# Patient Record
Sex: FEMALE | Race: WHITE | Employment: UNEMPLOYED | URBAN - METROPOLITAN AREA
[De-identification: names, ages, dates, MRNs, and addresses within clinical notes are randomized per-mention and may not be internally consistent; named-entity substitution may affect disease eponyms.]

---

## 2017-10-31 ENCOUNTER — OFFICE VISIT (OUTPATIENT)
Dept: CARDIOLOGY CLINIC | Age: 37
End: 2017-10-31

## 2017-10-31 VITALS
OXYGEN SATURATION: 98 % | HEART RATE: 84 BPM | WEIGHT: 129 LBS | DIASTOLIC BLOOD PRESSURE: 70 MMHG | BODY MASS INDEX: 20.73 KG/M2 | SYSTOLIC BLOOD PRESSURE: 100 MMHG | HEIGHT: 66 IN

## 2017-10-31 DIAGNOSIS — I49.1 PAC (PREMATURE ATRIAL CONTRACTION): Primary | ICD-10-CM

## 2017-10-31 DIAGNOSIS — I47.1 SVT (SUPRAVENTRICULAR TACHYCARDIA) (HCC): ICD-10-CM

## 2017-10-31 DIAGNOSIS — I47.1 ATRIAL TACHYCARDIA (HCC): ICD-10-CM

## 2017-10-31 DIAGNOSIS — R00.2 PALPITATIONS: ICD-10-CM

## 2017-10-31 PROCEDURE — 93000 ELECTROCARDIOGRAM COMPLETE: CPT | Performed by: INTERNAL MEDICINE

## 2017-10-31 PROCEDURE — 99205 OFFICE O/P NEW HI 60 MIN: CPT | Performed by: INTERNAL MEDICINE

## 2017-10-31 RX ORDER — ACETAMINOPHEN 160 MG
1 TABLET,DISINTEGRATING ORAL DAILY
COMMUNITY

## 2017-10-31 RX ORDER — DIPHENHYDRAMINE HCL 25 MG
25 CAPSULE ORAL EVERY 6 HOURS PRN
COMMUNITY
End: 2019-07-10 | Stop reason: ALTCHOICE

## 2017-10-31 NOTE — PROGRESS NOTES
[address]   Electrophysiology Consultation   Date: 10/31/2017  Reason for Consultation: PAC's/ atrial tachycardia  Consult Requesting Physician: Radha Regalado DO    Chief Complaint: Racing heart    HPI: Monika Kapoor is a 44 y. o.with a PMH significant for severe IBS, spastic colon and small bowel bacterial overgrowth disease managed with chronic pain medication and accupuncture (managed through [facility]). She presents today for further evaluation of PAC's and atrial tachycardia. She recently established care with [facility] and an EPS/ RFA was recommended but due to insurance coverage she needed to switch care. She states that she has multiple episodes almost daily that include a sensation of a racing heart, SOB and chest pain that leave her exhausted. She feels like the palpitations are \"going into her throat\" and her heart at times is \"flipping over. \"  [place] and is having difficulty completing her ADL's due to the associated SOB. She used to [employer] which helped but that is no longer providing enough control. She denies lightheadedness, dizziness, orthopnea, edema, presyncope or syncope. Past Medical History:   Diagnosis Date    Bacterial overgrowth syndrome     Chest pain on breathing     Heart murmur     IBS (irritable bowel syndrome)     Kidney stones     Lightheaded 1/12/2016    MVP (mitral valve prolapse)     Near syncope 1/12/2016    Palpitations 1/12/2016    [facility] tachy[facility]     Right ear pain 1/12/2016    Small bowel problem     states that she has small bowel overgrowth of bacteria    Spastic colon     Tachycardia         Past Surgical History:   Procedure Laterality Date    COLONOSCOPY  2009    DENTAL SURGERY      HYSTERECTOMY      LAPAROSCOPY         Allergies:   Allergies   Allergen Reactions    Multivitamins Other (See Comments)     IBS    Penicillins Hives    Percocet [Oxycodone-Acetaminophen]

## 2017-10-31 NOTE — PATIENT INSTRUCTIONS
Patient Education        Learning About Catheter Ablation for Heart Rhythm Problems  What is catheter ablation? Catheter ablation is a procedure that treats heart rhythm problems. These problems include atrial fibrillation, supraventricular tachycardia (SVT), atrial flutter, and ventricular tachycardia. Your heart should have a strong, steady beat. That beat is controlled by the heart's electrical system. Sometimes that system misfires. This causes a heartbeat that is too fast and isn't steady. Catheter ablation is a way to get into your heart and fix the problem. Ablation is not surgery. How is catheter ablation done? Your doctor inserts thin tubes called catheters into a blood vessel in your groin, arm, or neck. Then your doctor feeds them into the heart. Wires in the catheters help the doctor find the problem areas. Then he or she uses the wires to send energy to destroy the tiny areas of heart tissue that are causing the problems. It may seem like a bad idea to destroy parts of your heart on purpose. But the areas that are destroyed are very tiny. They should not affect your heart's ability to do its job. You may be awake during the procedure. Or you may be asleep. The doctor will give you medicines to help you feel relaxed and to numb the areas where the catheters go in. You may feel a little uncomfortable, but you should not feel pain. This procedure usually takes 2 to 6 hours. In rare cases, it can take longer. You may stay overnight in the hospital. How long you stay in the hospital depends on the type of ablation you have. What can you expect after catheter ablation? Do not exercise hard or lift anything heavy for a week. You will probably be able to go back to work and to your normal routine in 1 or 2 days. You may have swelling, bruising, or a small lump around the site where the catheters went into your body. These should go away in 3 to 4 weeks.   You may have to take some medicines for a Sometimes a procedure called catheter ablation is done during an EPS. This procedure destroys (ablates) small areas of your heart that are causing your heart rhythm problem. The doctor puts plastic tubes called catheters into blood vessels in your groin, arm, or neck. He or she then uses an X-ray machine to guide long wires through the tubes to your heart. The doctor can use these wires to record your heart's electrical signals. If the doctor thinks your problem can be fixed with ablation, he or she can use the wires to destroy a small part of your heart tissue. This is most often done with radio waves. You will probably be awake during the procedure. But you could be asleep. The doctor will give you medicines to help you feel relaxed and to numb the areas where the catheters go in. An EPS and ablation can take 2 to 6 hours. In rare cases, it can take longer. If you have EPS only and you do not need more treatment, you may go home the same day. But if you also have ablation, you may stay overnight in the hospital. How long you stay in the hospital depends on the type of ablation you have. Do not exercise hard or lift anything heavy for a week. You may be able to go back to work and to your normal routine in 1 or 2 days. Follow-up care is a key part of your treatment and safety. Be sure to make and go to all appointments, and call your doctor if you are having problems. It's also a good idea to know your test results and keep a list of the medicines you take. What happens before the procedure? Procedures can be stressful. This information will help you understand what you can expect. And it will help you safely prepare for your procedure. Preparing for the procedure  · Understand exactly what procedure is planned, along with the risks, benefits, and other options. · Tell your doctors ALL the medicines, vitamins, supplements, and herbal remedies you take.  Some of these can increase the risk of bleeding or interact with anesthesia. · If you take blood thinners, such as warfarin (Coumadin), clopidogrel (Plavix), or aspirin, be sure to talk to your doctor. He or she will tell you if you should stop taking these medicines before your procedure. Make sure that you understand exactly what your doctor wants you to do. · Your doctor will tell you which medicines to take or stop before your procedure. You may need to stop taking certain medicines a week or more before the procedure. So talk to your doctor as soon as you can. · If you have an advance directive, let your doctor know. It may include a living will and a durable power of  for health care. Bring a copy to the hospital. If you don't have one, you may want to prepare one. It lets your doctor and loved ones know your health care wishes. Doctors advise that everyone prepare these papers before any type of surgery or procedure. What happens on the day of the procedure? · Follow the instructions exactly about when to stop eating and drinking. If you don't, your procedure may be canceled. If your doctor told you to take your medicines on the day of the procedure, take them with only a sip of water. · Take a bath or shower before you come in for your procedure. Do not apply lotions, perfumes, deodorants, or nail polish. · Take off all jewelry and piercings. And take out contact lenses, if you wear them. At the hospital or surgery center  · Bring a picture ID. · You will be kept comfortable and safe by your anesthesia provider. The anesthesia may make you sleep. Or it may just numb the area being worked on. · EPS by itself may take 1 to 3 hours. But if you also have ablation, the whole procedure may take 2 to 6 hours. · After the procedure, pressure will be applied to the area where the catheter was put in your blood vessel. Then the area may be covered with a bandage or a compression device. This will prevent bleeding.   · Nurses will check your heart rate and blood pressure. The nurse will also check the catheter site for bleeding. · If the catheter was put in your groin, you will need to lie still and keep your leg straight for several hours. The nurse may put a weighted bag on your leg to keep it still. · If the catheter was put in your arm, you may be able to sit up and get out of bed right away. But you will need to keep your arm still for at least 1 hour. · You may have a bruise or a small lump where the catheter was put in your blood vessel. This is normal and will go away. Going home  · Be sure you have someone to drive you home. Anesthesia and pain medicine make it unsafe for you to drive. · You will be given more specific instructions about recovering from your procedure. They will cover things like diet, wound care, follow-up care, driving, and getting back to your normal routine. When should you call your doctor? · You have questions or concerns. · You don't understand how to prepare for your procedure. · You become ill before the procedure (such as fever, flu, or a cold). · You need to reschedule or have changed your mind about having the procedure. Where can you learn more? Go to [email or web address].Revver. org and sign in to your [facility] account. Enter F765 in the [facility] box to learn more about \"Electrophysiology Study and Catheter Ablation: Before Your Procedure. \"     If you do not have an account, please click on the \"Sign Up Now\" link. Current as of: April 3, 2017  Content Version: 11.3  © 5671-6274 [employer]. Care instructions adapted under license by [facility] ([facility]). If you have questions about a medical condition or this instruction, always ask your healthcare professional. [place] [address] any warranty or liability for your use of this information.

## 2017-11-02 ENCOUNTER — TELEPHONE (OUTPATIENT)
Dept: CARDIOLOGY CLINIC | Age: 37
End: 2017-11-02

## 2017-11-25 ENCOUNTER — HOSPITAL ENCOUNTER (OUTPATIENT)
Dept: OTHER | Age: 37
Discharge: HOME | End: 2017-11-25
Attending: INTERNAL MEDICINE | Admitting: INTERNAL MEDICINE

## 2017-11-25 LAB
ANION GAP SERPL CALCULATED.3IONS-SCNC: 10 MMOL/L (ref 3–16)
BUN BLDV-MCNC: 11 MG/DL (ref 7–20)
CALCIUM SERPL-MCNC: 9.8 MG/DL (ref 8.3–10.6)
CHLORIDE BLD-SCNC: 100 MMOL/L (ref 99–110)
CO2: 30 MMOL/L (ref 21–32)
CREAT SERPL-MCNC: 0.6 MG/DL (ref 0.6–1.1)
GFR AFRICAN AMERICAN: >60
GFR NON-AFRICAN AMERICAN: >60
GLUCOSE BLD-MCNC: 77 MG/DL (ref 70–99)
HCT VFR BLD CALC: 40.8 % (ref 36–48)
HEMOGLOBIN: 14.1 G/DL (ref 12–16)
INR BLD: 1.13 (ref 0.85–1.15)
MCH RBC QN AUTO: 28.9 PG (ref 26–34)
MCHC RBC AUTO-ENTMCNC: 34.5 G/DL (ref 31–36)
MCV RBC AUTO: 83.8 FL (ref 80–100)
PDW BLD-RTO: 13.4 % (ref 12.4–15.4)
PLATELET # BLD: 228 K/UL (ref 135–450)
PMV BLD AUTO: 8 FL (ref 5–10.5)
POTASSIUM SERPL-SCNC: 4.3 MMOL/L (ref 3.5–5.1)
PROTHROMBIN TIME: 12.8 SEC (ref 9.6–13)
RBC # BLD: 4.87 M/UL (ref 4–5.2)
SODIUM BLD-SCNC: 140 MMOL/L (ref 136–145)
WBC # BLD: 6.1 K/UL (ref 4–11)

## 2017-12-08 ENCOUNTER — TELEPHONE (OUTPATIENT)
Dept: CARDIOLOGY CLINIC | Age: 37
End: 2017-12-08

## 2017-12-11 DIAGNOSIS — M79.89 LEFT LEG SWELLING: ICD-10-CM

## 2017-12-11 DIAGNOSIS — I83.812 VARICOSE VEINS OF LEFT LOWER EXTREMITY WITH PAIN: ICD-10-CM

## 2017-12-11 DIAGNOSIS — M79.605 LEFT LEG PAIN: Primary | ICD-10-CM

## 2017-12-12 ENCOUNTER — HOSPITAL ENCOUNTER (OUTPATIENT)
Dept: VASCULAR LAB | Age: 37
Discharge: HOME | End: 2017-12-12
Attending: INTERNAL MEDICINE | Admitting: INTERNAL MEDICINE

## 2017-12-12 DIAGNOSIS — M79.605 LEFT LEG PAIN: ICD-10-CM

## 2017-12-12 DIAGNOSIS — M79.605 PAIN OF LEFT LEG: ICD-10-CM

## 2017-12-12 DIAGNOSIS — M79.89 LEFT LEG SWELLING: ICD-10-CM

## 2017-12-12 DIAGNOSIS — I83.812 VARICOSE VEINS OF LEFT LOWER EXTREMITY WITH PAIN: ICD-10-CM

## 2017-12-12 NOTE — TELEPHONE ENCOUNTER
Imaging has not resulted yet. I called pt to let her know that we will call her once the physician has sent through results. She verbalized understanding.

## 2018-01-10 ENCOUNTER — OFFICE VISIT (OUTPATIENT)
Dept: CARDIOLOGY CLINIC | Age: 38
End: 2018-01-10

## 2018-01-10 VITALS
WEIGHT: 128 LBS | BODY MASS INDEX: 20.57 KG/M2 | SYSTOLIC BLOOD PRESSURE: 98 MMHG | OXYGEN SATURATION: 99 % | HEART RATE: 62 BPM | DIASTOLIC BLOOD PRESSURE: 62 MMHG | HEIGHT: 66 IN

## 2018-01-10 DIAGNOSIS — Z86.79 S/P ABLATION OF VENTRICULAR ARRHYTHMIA: ICD-10-CM

## 2018-01-10 DIAGNOSIS — I47.1 SVT (SUPRAVENTRICULAR TACHYCARDIA) (HCC): Primary | ICD-10-CM

## 2018-01-10 DIAGNOSIS — Z98.890 S/P ABLATION OF VENTRICULAR ARRHYTHMIA: ICD-10-CM

## 2018-01-10 DIAGNOSIS — I47.1 ATRIAL TACHYCARDIA (HCC): ICD-10-CM

## 2018-01-10 PROCEDURE — 99213 OFFICE O/P EST LOW 20 MIN: CPT | Performed by: NURSE PRACTITIONER

## 2018-01-10 PROCEDURE — 93000 ELECTROCARDIOGRAM COMPLETE: CPT | Performed by: NURSE PRACTITIONER

## 2018-01-10 NOTE — PROGRESS NOTES
 Palpitations 01/12/2016    Near syncope 01/12/2016        Plan:  1. Atrial tach   S/p EPS and ablation    Remains in SR   2. Palpitations   Improved    Continue to monitor     Thank you for allowing me to participate in the care of Hernán Puente. Further evaluation will be based upon the patient's clinical course and testing results. All questions and concerns were addressed to the patient/family. Alternatives to my treatment were discussed. Yung Chawla, [address]  Electrophysiology   1/10/2018  11:57 AM          Patient instructed on life style modifications   Tobacco use was discussed with the patient and patient educated on the negative effects. I have asked the patient to not utilize these agents.       FASTING LIPID PANEL:No results found for: HDL, LDLDIRECT, LDLCALC, TRIG

## 2019-05-21 ENCOUNTER — TELEPHONE (OUTPATIENT)
Dept: CARDIOLOGY CLINIC | Age: 39
End: 2019-05-21

## 2019-05-21 DIAGNOSIS — I47.1 SVT (SUPRAVENTRICULAR TACHYCARDIA) (HCC): Primary | ICD-10-CM

## 2019-05-21 NOTE — TELEPHONE ENCOUNTER
appt made with GWEN for 7/10/19 at 1245, kimmy will make pt a event monitor appt. Pt was instructed that if her sx get worse at any time the she is to go to the ER. I advised her also that if there is something that shows up on the monitor that Christiano will possibly call her then us to advise of the sx she was having. She verbalized understanding.

## 2019-05-21 NOTE — TELEPHONE ENCOUNTER
PT calling was a pt of Dr Erinn Barrera at [place] until he left. Pt would now like to come see MXEZIO here at [facility]. She has been having Tachycardia again really bad, has tingling in left arm, would like to get in next week if possible.  Can we do either 05/29/19 12:15 or 05/30/19 12:45pm? Pls advise Thank you

## 2019-05-23 ENCOUNTER — NURSE ONLY (OUTPATIENT)
Dept: CARDIOLOGY CLINIC | Age: 39
End: 2019-05-23
Payer: COMMERCIAL

## 2019-05-23 DIAGNOSIS — I47.1 SVT (SUPRAVENTRICULAR TACHYCARDIA) (HCC): Primary | ICD-10-CM

## 2019-06-25 PROCEDURE — 93228 REMOTE 30 DAY ECG REV/REPORT: CPT | Performed by: INTERNAL MEDICINE

## 2019-07-09 NOTE — PROGRESS NOTES
paternal grandfather, and paternal grandmother; High Blood Pressure in her mother; High Cholesterol in her mother; Other in her maternal grandfather, maternal grandmother, paternal grandfather, paternal grandmother, and sister. Reviewed. Denies family history of sudden cardiac death, arrhythmia, premature CAD    Review of System:  All other systems reviewed and are negative except for that noted above. Pertinent negatives are:   General: negative for fever, chills   Ophthalmic ROS: negative for - eye pain or loss of vision  ENT ROS: negative for - headaches, sore throat   Respiratory: negative for - cough, sputum  Cardiovascular: Reviewed in HPI  Gastrointestinal: negative for - abdominal pain, diarrhea, N/V  Hematology: negative for - bleeding, blood clots, bruising or jaundice  Genito-Urinary:  negative for - Dysuria or incontinence  Musculoskeletal: negative for - Joint swelling, muscle pain  Neurological: negative for - confusion, dizziness, headaches   Psychiatric: No anxiety, no depression. Dermatological: negative for - rash    Physical Examination:  Vitals:    07/10/19 1237   BP: 120/78   Pulse: 62      Wt Readings from Last 3 Encounters:   07/10/19 137 lb (62.1 kg)   01/10/18 128 lb (58.1 kg)   12/01/17 124 lb 5.4 oz (56.4 kg)       · Constitutional: Oriented. No distress. · Head: Normocephalic and atraumatic. · Mouth/Throat: Oropharynx is clear and moist.   · Eyes: Conjunctivae normal. EOM are normal.   · Neck: Neck supple. No rigidity. No JVD present. · Cardiovascular: Normal rate, regular rhythm, S1&S2. · Pulmonary/Chest: Bilateral respiratory sounds. No wheezes, No rhonchi. · Abdominal: Soft. Bowel sounds present. No distension, No tenderness. · Musculoskeletal: No tenderness. No edema    · Lymphadenopathy: Has no cervical adenopathy. · Neurological: Alert and oriented. Cranial nerve appears intact, No Gross deficit   · Skin: Skin is warm and dry. No rash noted.    · Psychiatric: Has PVC burden    Sinus tachycardia    F/u if worsening and will do ablation      MVP   stable  I independently reviewed  MCOT *    Thank you for allowing me to participate in the care of Jennifer West. Further evaluation will be based upon the patient's clinical course and testing results. All questions and concerns were addressed to the patient/family. Alternatives to my treatment were discussed. I have discussed the above stated plan and the patient verbalized understanding and agreed with the plan. NOTE: This report was transcribed using voice recognition software. Every effort was made to ensure accuracy, however, inadvertent computerized transcription errors may be present. Og Boles MD, U[facility] Cazares [address] [facility]   Office: [phone]    Scribe attestation:  This note was scribed in the presence of Og Boles MD by Kesha Lyn RN    I, Dr. Og Boles personally performed the services described in this documentation as scribed by RN in my presence, and it is both accurate and complete.

## 2019-07-10 ENCOUNTER — OFFICE VISIT (OUTPATIENT)
Dept: CARDIOLOGY CLINIC | Age: 39
End: 2019-07-10
Payer: COMMERCIAL

## 2019-07-10 VITALS
WEIGHT: 137 LBS | DIASTOLIC BLOOD PRESSURE: 78 MMHG | BODY MASS INDEX: 22.02 KG/M2 | HEIGHT: 66 IN | HEART RATE: 62 BPM | SYSTOLIC BLOOD PRESSURE: 120 MMHG

## 2019-07-10 DIAGNOSIS — I47.1 ATRIAL TACHYCARDIA (HCC): Primary | ICD-10-CM

## 2019-07-10 DIAGNOSIS — I47.1 SVT (SUPRAVENTRICULAR TACHYCARDIA) (HCC): ICD-10-CM

## 2019-07-10 DIAGNOSIS — R00.2 PALPITATIONS: ICD-10-CM

## 2019-07-10 DIAGNOSIS — I34.1 MVP (MITRAL VALVE PROLAPSE): ICD-10-CM

## 2019-07-10 DIAGNOSIS — R00.0 TACHYCARDIA: ICD-10-CM

## 2019-07-10 PROCEDURE — 99214 OFFICE O/P EST MOD 30 MIN: CPT | Performed by: INTERNAL MEDICINE

## 2019-07-10 PROCEDURE — 93000 ELECTROCARDIOGRAM COMPLETE: CPT | Performed by: INTERNAL MEDICINE

## 2019-07-17 ENCOUNTER — HOSPITAL ENCOUNTER (OUTPATIENT)
Dept: MAMMOGRAPHY | Age: 39
Discharge: HOME | End: 2019-07-17
Payer: COMMERCIAL

## 2019-07-17 ENCOUNTER — HOSPITAL ENCOUNTER (OUTPATIENT)
Dept: ULTRASOUND IMAGING | Age: 39
Discharge: HOME | End: 2019-07-17
Payer: COMMERCIAL

## 2019-07-17 DIAGNOSIS — N63.0 BREAST LUMP OR MASS: ICD-10-CM

## 2019-07-17 DIAGNOSIS — N64.4 BREAST PAIN: ICD-10-CM

## 2019-07-17 PROCEDURE — 76642 ULTRASOUND BREAST LIMITED: CPT

## 2019-07-17 PROCEDURE — G0279 TOMOSYNTHESIS, MAMMO: HCPCS

## 2020-03-14 ENCOUNTER — APPOINTMENT (OUTPATIENT)
Dept: CT IMAGING | Age: 40
End: 2020-03-14
Payer: COMMERCIAL

## 2020-03-14 ENCOUNTER — APPOINTMENT (OUTPATIENT)
Dept: ULTRASOUND IMAGING | Age: 40
End: 2020-03-14
Payer: COMMERCIAL

## 2020-03-14 ENCOUNTER — HOSPITAL ENCOUNTER (EMERGENCY)
Age: 40
Discharge: HOME | End: 2020-03-14
Attending: EMERGENCY MEDICINE
Payer: COMMERCIAL

## 2020-03-14 VITALS
TEMPERATURE: 97.9 F | HEART RATE: 64 BPM | WEIGHT: 136 LBS | RESPIRATION RATE: 19 BRPM | HEIGHT: 65 IN | OXYGEN SATURATION: 100 % | BODY MASS INDEX: 22.66 KG/M2 | DIASTOLIC BLOOD PRESSURE: 67 MMHG | SYSTOLIC BLOOD PRESSURE: 120 MMHG

## 2020-03-14 LAB
A/G RATIO: 1.4 (ref 1.1–2.2)
ALBUMIN SERPL-MCNC: 4.5 G/DL (ref 3.4–5)
ALP BLD-CCNC: 67 U/L (ref 40–129)
ALT SERPL-CCNC: 11 U/L (ref 10–40)
ANION GAP SERPL CALCULATED.3IONS-SCNC: 12 MMOL/L (ref 3–16)
AST SERPL-CCNC: 21 U/L (ref 15–37)
BASOPHILS ABSOLUTE: 0 K/UL (ref 0–0.2)
BASOPHILS RELATIVE PERCENT: 0.2 %
BILIRUB SERPL-MCNC: 0.5 MG/DL (ref 0–1)
BILIRUBIN URINE: NEGATIVE
BLOOD, URINE: NEGATIVE
BUN BLDV-MCNC: 8 MG/DL (ref 7–20)
CALCIUM SERPL-MCNC: 9.6 MG/DL (ref 8.3–10.6)
CHLORIDE BLD-SCNC: 98 MMOL/L (ref 99–110)
CLARITY: CLEAR
CO2: 26 MMOL/L (ref 21–32)
COLOR: YELLOW
CREAT SERPL-MCNC: <0.5 MG/DL (ref 0.6–1.1)
EOSINOPHILS ABSOLUTE: 0 K/UL (ref 0–0.6)
EOSINOPHILS RELATIVE PERCENT: 0.3 %
GFR AFRICAN AMERICAN: >60
GFR NON-AFRICAN AMERICAN: >60
GLOBULIN: 3.2 G/DL
GLUCOSE BLD-MCNC: 104 MG/DL (ref 70–99)
GLUCOSE URINE: NEGATIVE MG/DL
HCG QUALITATIVE: NEGATIVE
HCT VFR BLD CALC: 43.2 % (ref 36–48)
HEMOGLOBIN: 14.6 G/DL (ref 12–16)
KETONES, URINE: NEGATIVE MG/DL
LEUKOCYTE ESTERASE, URINE: NEGATIVE
LIPASE: 26 U/L (ref 13–60)
LYMPHOCYTES ABSOLUTE: 0.9 K/UL (ref 1–5.1)
LYMPHOCYTES RELATIVE PERCENT: 15.1 %
MCH RBC QN AUTO: 29.4 PG (ref 26–34)
MCHC RBC AUTO-ENTMCNC: 33.9 G/DL (ref 31–36)
MCV RBC AUTO: 86.6 FL (ref 80–100)
MICROSCOPIC EXAMINATION: NORMAL
MONOCYTES ABSOLUTE: 0.2 K/UL (ref 0–1.3)
MONOCYTES RELATIVE PERCENT: 3.9 %
NEUTROPHILS ABSOLUTE: 5 K/UL (ref 1.7–7.7)
NEUTROPHILS RELATIVE PERCENT: 80.5 %
NITRITE, URINE: NEGATIVE
PDW BLD-RTO: 13.5 % (ref 12.4–15.4)
PH UA: 7 (ref 5–8)
PLATELET # BLD: 249 K/UL (ref 135–450)
PMV BLD AUTO: 7.4 FL (ref 5–10.5)
POTASSIUM REFLEX MAGNESIUM: 3.9 MMOL/L (ref 3.5–5.1)
PROTEIN UA: NEGATIVE MG/DL
RBC # BLD: 4.99 M/UL (ref 4–5.2)
SODIUM BLD-SCNC: 136 MMOL/L (ref 136–145)
SPECIFIC GRAVITY UA: 1.01 (ref 1–1.03)
TOTAL PROTEIN: 7.7 G/DL (ref 6.4–8.2)
URINE TYPE: NORMAL
UROBILINOGEN, URINE: 0.2 E.U./DL
WBC # BLD: 6.2 K/UL (ref 4–11)

## 2020-03-14 PROCEDURE — 80053 COMPREHEN METABOLIC PANEL: CPT

## 2020-03-14 PROCEDURE — 74177 CT ABD & PELVIS W/CONTRAST: CPT

## 2020-03-14 PROCEDURE — 76705 ECHO EXAM OF ABDOMEN: CPT

## 2020-03-14 PROCEDURE — 96361 HYDRATE IV INFUSION ADD-ON: CPT

## 2020-03-14 PROCEDURE — 81003 URINALYSIS AUTO W/O SCOPE: CPT

## 2020-03-14 PROCEDURE — 2580000003 HC RX 258

## 2020-03-14 PROCEDURE — 83690 ASSAY OF LIPASE: CPT

## 2020-03-14 PROCEDURE — 99284 EMERGENCY DEPT VISIT MOD MDM: CPT

## 2020-03-14 PROCEDURE — 96374 THER/PROPH/DIAG INJ IV PUSH: CPT

## 2020-03-14 PROCEDURE — 6360000002 HC RX W HCPCS: Performed by: EMERGENCY MEDICINE

## 2020-03-14 PROCEDURE — 96375 TX/PRO/DX INJ NEW DRUG ADDON: CPT

## 2020-03-14 PROCEDURE — 84703 CHORIONIC GONADOTROPIN ASSAY: CPT

## 2020-03-14 PROCEDURE — 6360000002 HC RX W HCPCS: Performed by: STUDENT IN AN ORGANIZED HEALTH CARE EDUCATION/TRAINING PROGRAM

## 2020-03-14 PROCEDURE — 6360000004 HC RX CONTRAST MEDICATION: Performed by: EMERGENCY MEDICINE

## 2020-03-14 PROCEDURE — 85025 COMPLETE CBC W/AUTO DIFF WBC: CPT

## 2020-03-14 RX ORDER — SODIUM CHLORIDE 9 MG/ML
1000 INJECTION, SOLUTION INTRAVENOUS CONTINUOUS
Status: DISCONTINUED | OUTPATIENT
Start: 2020-03-14 | End: 2020-03-14 | Stop reason: HOSPADM

## 2020-03-14 RX ORDER — MORPHINE SULFATE 4 MG/ML
4 INJECTION, SOLUTION INTRAMUSCULAR; INTRAVENOUS ONCE
Status: DISCONTINUED | OUTPATIENT
Start: 2020-03-14 | End: 2020-03-14 | Stop reason: HOSPADM

## 2020-03-14 RX ORDER — ONDANSETRON 2 MG/ML
4 INJECTION INTRAMUSCULAR; INTRAVENOUS ONCE
Status: COMPLETED | OUTPATIENT
Start: 2020-03-14 | End: 2020-03-14

## 2020-03-14 RX ORDER — SODIUM CHLORIDE 9 MG/ML
INJECTION, SOLUTION INTRAVENOUS
Status: COMPLETED
Start: 2020-03-14 | End: 2020-03-14

## 2020-03-14 RX ADMIN — SODIUM CHLORIDE 1000 ML: 9 INJECTION, SOLUTION INTRAVENOUS at 10:48

## 2020-03-14 RX ADMIN — IOPAMIDOL 75 ML: 755 INJECTION, SOLUTION INTRAVENOUS at 14:23

## 2020-03-14 RX ADMIN — ONDANSETRON 4 MG: 2 INJECTION INTRAMUSCULAR; INTRAVENOUS at 10:48

## 2020-03-14 RX ADMIN — HYDROMORPHONE HYDROCHLORIDE 0.5 MG: 1 INJECTION, SOLUTION INTRAMUSCULAR; INTRAVENOUS; SUBCUTANEOUS at 14:41

## 2020-03-14 ASSESSMENT — ENCOUNTER SYMPTOMS
TROUBLE SWALLOWING: 0
NAUSEA: 1
ABDOMINAL PAIN: 1
BLOOD IN STOOL: 0
COLOR CHANGE: 0
SORE THROAT: 0
VOMITING: 0
COUGH: 0
RHINORRHEA: 0
SHORTNESS OF BREATH: 0
BACK PAIN: 1
CONSTIPATION: 0
DIARRHEA: 1

## 2020-03-14 ASSESSMENT — PAIN SCALES - GENERAL
PAINLEVEL_OUTOF10: 7
PAINLEVEL_OUTOF10: 6
PAINLEVEL_OUTOF10: 3

## 2020-03-14 ASSESSMENT — PAIN DESCRIPTION - PAIN TYPE: TYPE: ACUTE PAIN

## 2020-03-14 ASSESSMENT — PAIN DESCRIPTION - ONSET: ONSET: ON-GOING

## 2020-03-14 ASSESSMENT — PAIN DESCRIPTION - FREQUENCY: FREQUENCY: INTERMITTENT

## 2020-03-14 ASSESSMENT — PAIN DESCRIPTION - PROGRESSION: CLINICAL_PROGRESSION: GRADUALLY WORSENING

## 2020-03-14 ASSESSMENT — PAIN DESCRIPTION - LOCATION: LOCATION: ABDOMEN

## 2020-03-14 ASSESSMENT — PAIN DESCRIPTION - DESCRIPTORS: DESCRIPTORS: SHARP;STABBING

## 2020-03-14 ASSESSMENT — PAIN DESCRIPTION - ORIENTATION: ORIENTATION: RIGHT

## 2020-03-14 NOTE — ED NOTES
Emergency Department Provider Note  Location: [address] [place] [address]  ED  3/14/2020     Patient Identification  Telma Pham is a 35 y.o. female    Chief Complaint  Abdominal Pain (radiates right to left)      Mode of Arrival  private car (dropped off)    HPI  (History provided by patient)  This is a 35 y.o. female with pmhx significant for IBS, spastic colon, PSVT s/p ablation, severe endometriosis s/p hysterectomy, presenting today with a 2 week history of worsening right upper quadrant pain. Patient states that the pain initially was limited to the RUQ but has progressed to the left side and around to her back. Reports that pain is worse 30m-1h after eating, as well as with bending over. She reports that pain was intermittent but over the past 3 days has been constant. She has also been having non-bloody diarrhea and nausea this morning with loss of appetite. ROS  Review of Systems   Constitutional: Negative for chills and fever. HENT: Negative for rhinorrhea, sore throat and trouble swallowing. Respiratory: Negative for cough and shortness of breath. Gastrointestinal: Positive for abdominal pain (RUQ and baseline lower abdominal colonic pain), diarrhea (started this morning) and nausea. Negative for blood in stool, constipation and vomiting. Endocrine: Negative for polyuria. Genitourinary: Positive for flank pain. Negative for frequency, hematuria and urgency. Musculoskeletal: Positive for back pain (R>L). Skin: Negative for color change. Neurological: Negative for dizziness, light-headedness and headaches. I have reviewed the following nursing documentation:  Allergies:    Allergies   Allergen Reactions    Multivitamins Other (See Comments)     IBS    Penicillins Hives    Percocet [Oxycodone-Acetaminophen] Swelling    Vicodin [Hydrocodone-Acetaminophen] Swelling    Zithromax [Azithromycin] Nausea Only    Ciprofloxacin     Deltasone [Prednisone]     Tramadol Chelsey Lyons on 03/14/20 at [address] [place]    03/14/20 1045 03/14/20 1034  morphine (PF) injection 4 mg  ONCE      Last MAR action:  Not Given - by Shlomo April on 03/14/20 at [address] [place]    03/14/20 1045 03/14/20 1034  ondansetron (ZOFRAN) injection 4 mg  ONCE      Last MAR action:  Given - by Shlomo April on 03/14/20 at [address]    03/14/20 1045 03/14/20 1038  0.9 % sodium chloride infusion  CONTINUOUS      Last MAR action:  Stopped - by Shlomo April on 03/14/20 at [facility]            Radiology  Ct Abdomen Pelvis W Iv Contrast Additional Contrast? None    Result Date: 3/14/2020  EXAMINATION: CT OF THE ABDOMEN AND PELVIS WITH CONTRAST 3/14/2020 2:21 pm TECHNIQUE: CT of the abdomen and pelvis was performed with the administration of intravenous contrast. Multiplanar reformatted images are provided for review. Dose modulation, iterative reconstruction, and/or weight based adjustment of the mA/kV was utilized to reduce the radiation dose to as low as reasonably achievable. COMPARISON: 06/15/2017 HISTORY: ORDERING SYSTEM PROVIDED HISTORY: diffuse abdominal pain, worse in the RUQ, also diarrhea and has history of colitis TECHNOLOGIST PROVIDED HISTORY: Additional Contrast?->None Reason for exam:->diffuse abdominal pain, worse in the RUQ, also diarrhea and has history of colitis Reason for Exam: diffuse abdominal pain X 2 weeks, worse in the RUQ, also diarrhea and has history of colitis Acuity: Acute Type of Exam: Initial Additional signs and symptoms: prev hysterectomy and laps for endometriosis FINDINGS: Lower Chest: There is no consolidation or effusion. Organs: The liver, pancreas, spleen, kidneys and adrenals are unremarkable. GI/Bowel: There is no bowel dilatation, wall thickening or obstruction. The appendix is normal.  Note is made of a small gastric diverticulum. Pelvis: The bladder is unremarkable. The uterus is surgically absent.  Peritoneum/Retroperitoneum: There is no free air, 0.2 0.0 - 1.3 K/uL    Eosinophils Absolute 0.0 0.0 - 0.6 K/uL    Basophils Absolute 0.0 0.0 - 0.2 K/uL   Comprehensive Metabolic Panel w/ Reflex to MG   Result Value Ref Range    Sodium 136 136 - 145 mmol/L    Potassium reflex Magnesium 3.9 3.5 - 5.1 mmol/L    Chloride 98 (L) 99 - 110 mmol/L    CO2 26 21 - 32 mmol/L    Anion Gap 12 3 - 16    Glucose 104 (H) 70 - 99 mg/dL    BUN 8 7 - 20 mg/dL    CREATININE <0.5 (L) 0.6 - 1.1 mg/dL    GFR Non-African American >60 >60    GFR African American >60 >60    Calcium 9.6 8.3 - 10.6 mg/dL    Total Protein 7.7 6.4 - 8.2 g/dL    Alb 4.5 3.4 - 5.0 g/dL    Albumin/Globulin Ratio 1.4 1.1 - 2.2    Total Bilirubin 0.5 0.0 - 1.0 mg/dL    Alkaline Phosphatase 67 40 - 129 U/L    ALT 11 10 - 40 U/L    AST 21 15 - 37 U/L    Globulin 3.2 g/dL   Urinalysis, reflex to microscopic   Result Value Ref Range    Color, UA Yellow Straw/Yellow    Clarity, UA Clear Clear    Glucose, Ur Negative Negative mg/dL    Bilirubin Urine Negative Negative    Ketones, Urine Negative Negative mg/dL    Specific Gravity, UA 1.010 1.005 - 1.030    Blood, Urine Negative Negative    pH, UA 7.0 5.0 - 8.0    Protein, UA Negative Negative mg/dL    Urobilinogen, Urine 0.2 <2.0 E.U./dL    Nitrite, Urine Negative Negative    Leukocyte Esterase, Urine Negative Negative    Microscopic Examination Not Indicated     Urine Type NotGiven    HCG Qualitative, Serum   Result Value Ref Range    hCG Qual Negative Detects HCG level >10 MIU/mL   Lipase   Result Value Ref Range    Lipase 26.0 13.0 - 60.0 U/L         - Patient seen and evaluated in room 6.  35 y.o. female presented for worsening RUQ abdominal pain x2 weeks and diarrhea. - Pertinent old records reviewed. - Patient was given 4mg zofran in the ED with improvement of nausea. Patient declined morphine injection because she states it does not work for her.    - Diagnostic studies reviewed.   RUQ US was unremarkable and a CT abd/pelvis was ordered for further evaluation which also showed no acute abdominopelvic abnormality. As such, patient requires a HIDA scan which we are unable to order in the ED. - I discussed the results with patient.      - Return precautions also discussed. Patient verbalized understanding of care plan and agreed to follow-up with PCP as advised in order to facilitate obtaining a HIDA scan. Clinical Impression:  1. Abdominal pain, right upper quadrant          Disposition:  Discharge to home in stable condition. Blood pressure 120/67, pulse 64, temperature 97.9 °F (36.6 °C), temperature source Oral, resp. rate 19, height 5' 5\" (1.651 m), weight 136 lb (61.7 kg), SpO2 100 %, not currently breastfeeding. Patient was given scripts for the following medications. I counseled patient how to take these medications.    New Prescriptions    No medications on file       Disposition referral (if applicable):  Nathen Caputo [place] [address] [phone]    Schedule an appointment as soon as possible for a visit   to order HIDA scan          Miladys Ruby DO  [facility], [facility]  Resident  03/14/20 1500

## 2020-03-14 NOTE — ED PROVIDER NOTES
Emergency Department Provider Note     Location: [address] [place] [address]  ED  3/14/2020    I independently performed a history and physical on [facility]. All diagnostic, treatment, and disposition decisions were made by myself in conjunction with resident physician, Dr. Erick Moon. I have discussed with the resident the management of this patient. Briefly, this is a 35 y.o. female here for RUQ abd pain. Pain is worse with food also with bending over. No fever. She also reports diarrhea. No bloody stool. ED Triage Vitals [03/14/20 0922]   BP Temp Temp Source Pulse Resp SpO2 Height Weight   125/88 97.9 °F (36.6 °C) Oral 76 19 99 % 5' 5\" (1.651 m) 136 lb (61.7 kg)        Exam showed WDWN F, NAD, ACOx3, heart RRR, lungs clear, abd soft, ND, RUQ and epigastric tender to palpation. Active BS.       Results for orders placed or performed during the hospital encounter of 03/14/20   CBC Auto Differential   Result Value Ref Range    WBC 6.2 4.0 - 11.0 K/uL    RBC 4.99 4.00 - 5.20 M/uL    Hemoglobin 14.6 12.0 - 16.0 g/dL    Hematocrit 43.2 36.0 - 48.0 %    MCV 86.6 80.0 - 100.0 fL    MCH 29.4 26.0 - 34.0 pg    MCHC 33.9 31.0 - 36.0 g/dL    RDW 13.5 12.4 - 15.4 %    Platelets 389 276 - 745 K/uL    MPV 7.4 5.0 - 10.5 fL    Neutrophils % 80.5 %    Lymphocytes % 15.1 %    Monocytes % 3.9 %    Eosinophils % 0.3 %    Basophils % 0.2 %    Neutrophils Absolute 5.0 1.7 - 7.7 K/uL    Lymphocytes Absolute 0.9 (L) 1.0 - 5.1 K/uL    Monocytes Absolute 0.2 0.0 - 1.3 K/uL    Eosinophils Absolute 0.0 0.0 - 0.6 K/uL    Basophils Absolute 0.0 0.0 - 0.2 K/uL   Comprehensive Metabolic Panel w/ Reflex to MG   Result Value Ref Range    Sodium 136 136 - 145 mmol/L    Potassium reflex Magnesium 3.9 3.5 - 5.1 mmol/L    Chloride 98 (L) 99 - 110 mmol/L    CO2 26 21 - 32 mmol/L    Anion Gap 12 3 - 16    Glucose 104 (H) 70 - 99 mg/dL    BUN 8 7 - 20 mg/dL    CREATININE <0.5 (L) 0.6 - 1.1 mg/dL    GFR Non-African American >60 >60    GFR colitis Acuity: Acute Type of Exam: Initial Additional signs and symptoms: prev hysterectomy and laps for endometriosis FINDINGS: Lower Chest: There is no consolidation or effusion. Organs: The liver, pancreas, spleen, kidneys and adrenals are unremarkable. GI/Bowel: There is no bowel dilatation, wall thickening or obstruction. The appendix is normal.  Note is made of a small gastric diverticulum. Pelvis: The bladder is unremarkable. The uterus is surgically absent. Peritoneum/Retroperitoneum: There is no free air, free fluid or intraperitoneal inflammatory change. There is no adenopathy. Bones/Soft Tissues: There is no fracture or aggressive osseous lesion. No acute abdominopelvic abnormality. Us Gallbladder Ruq    Result Date: 3/14/2020  EXAMINATION: RIGHT UPPER QUADRANT ULTRASOUND 3/14/2020 10:42 am COMPARISON: CT abdomen 06/15/2017 and 02/26/2016 HISTORY: ORDERING SYSTEM PROVIDED HISTORY: RUQ pain, worse with food and bending over TECHNOLOGIST PROVIDED HISTORY: Reason for exam:-> RUQ pain, worse with food and bending over FINDINGS: LIVER:  The liver demonstrates normal echogenicity without evidence of intrahepatic biliary ductal dilatation. Hepatopetal (normal direction) vascular flow in the main portal vein on duplex interrogation. BILIARY SYSTEM:  Gallbladder is incompletely distended, otherwise unremarkable, without evidence of pericholecystic fluid, wall thickening or stones. Negative sonographic Juarez's sign. Common bile duct is within normal limits measuring 2 mm. RIGHT KIDNEY: The right kidney is grossly unremarkable without evidence of hydronephrosis. Right kidney long dimension is 9.8 cm. PANCREAS:  Visualized portions of the pancreas are unremarkable. OTHER: No evidence of right upper quadrant ascites. Unremarkable right upper quadrant ultrasound.       I estimate there is LOW risk for ACUTE APPENDICITIS, BOWEL OBSTRUCTION, CHOLECYSTITIS, COMPLICATED DIVERTICULITIS, INCARCERATED HERNIA, PANCREATITIS, PELVIC INFLAMMATORY DISEASE, PERFORATED BOWEL or ULCER, ECTOPIC PREGNANCY, or TUBO-OVARIAN ABSCESS, thus I consider the discharge disposition reasonable. Also, there is no evidence or peritonitis, sepsis, or toxicity. [facility] and I have discussed the diagnosis and risks, and we agree with discharging home to follow-up with PCP. We also discussed returning to the Emergency Department immediately if new or worsening symptoms occur. We have discussed the symptoms which are most concerning (e.g., bloody stool, fever, changing or worsening pain, vomiting) that necessitate immediate return. For further details of [facility] emergency department encounter, please see Dr. Farhat Hendrix documentation. This chart was generated in part by using Dragon Dictation system and may contain errors related to that system including errors in grammar, punctuation, and spelling, as well as words and phrases that may be inappropriate. If there are any questions or concerns please feel free to contact the dictating provider for clarification.         Yoandy Peterson MD  03/20/20 2836

## 2020-03-26 ENCOUNTER — HOSPITAL ENCOUNTER (OUTPATIENT)
Dept: NUCLEAR MEDICINE | Age: 40
Discharge: HOME | End: 2020-03-26
Payer: COMMERCIAL

## 2020-03-26 VITALS — HEIGHT: 65 IN | WEIGHT: 125 LBS | BODY MASS INDEX: 20.83 KG/M2

## 2020-03-26 PROCEDURE — 2580000003 HC RX 258: Performed by: FAMILY MEDICINE

## 2020-03-26 PROCEDURE — 6360000004 HC RX CONTRAST MEDICATION: Performed by: FAMILY MEDICINE

## 2020-03-26 PROCEDURE — A9537 TC99M MEBROFENIN: HCPCS | Performed by: FAMILY MEDICINE

## 2020-03-26 PROCEDURE — 78227 HEPATOBIL SYST IMAGE W/DRUG: CPT

## 2020-03-26 PROCEDURE — 3430000000 HC RX DIAGNOSTIC RADIOPHARMACEUTICAL: Performed by: FAMILY MEDICINE

## 2020-03-26 RX ADMIN — Medication 6 MILLICURIE: at 08:00

## 2020-03-26 RX ADMIN — SODIUM CHLORIDE 1.13 MCG: 9 INJECTION, SOLUTION INTRAVENOUS at 09:08

## 2020-03-27 ENCOUNTER — TELEPHONE (OUTPATIENT)
Dept: SURGERY | Age: 40
End: 2020-03-27

## 2020-03-27 NOTE — TELEPHONE ENCOUNTER
Called patient, she has been in extreme pain after being seen at the ED. She is on a strict diet to avoid gallbladder episodes. She is not eating greasy,fatty, spicy foods and has been on pain medication. She stated per  her gallbladder is not functioning. Scheduled urgent consult 4/3/20, patient is agreeable of COVID-19 restrictions & to call the  when arriving for appointment in the parking lot to avoid patients sitting in waiting room.

## 2020-04-03 ENCOUNTER — OFFICE VISIT (OUTPATIENT)
Dept: SURGERY | Age: 40
End: 2020-04-03
Payer: COMMERCIAL

## 2020-04-03 VITALS
WEIGHT: 132 LBS | BODY MASS INDEX: 21.21 KG/M2 | SYSTOLIC BLOOD PRESSURE: 106 MMHG | TEMPERATURE: 97.1 F | HEIGHT: 66 IN | DIASTOLIC BLOOD PRESSURE: 70 MMHG

## 2020-04-03 PROCEDURE — 99203 OFFICE O/P NEW LOW 30 MIN: CPT | Performed by: SURGERY

## 2020-04-03 NOTE — LETTER
 Tramadol        No.   PHYSICIAN ORDERS   HUC/  RN      ORDERS WITH CHECK BOXES MUST BE SELECTED. ALL OTHER ORDERS WILL BE AUTOMATICALLY INITIATED. Date / Time of Order:   4/27/20   1:19 PM          Procedure: Laparoscopic cholecystectomy with gram     Lap Cholecystectomies:      IF allergic to Penicillin or Cephlasporins, give          Ciprofloxacin (Cipro)   400 mg IVPB             IF allergic to Penicillin or Cephlasporins, give          Vancomycin 1 gm IVPB          Other orders:          Physician / Surgeon:  MD Bart Loya 132  Office Ph:  [phone]       Physician Signature:     9/07       [facility] [facility] [address] [place] [address] [facility]  Ph  [phone]        Fax  [phone]        Instructions for Outpatient Surgery or 23 hour Observation    Patient Name: Ailyn Handley:    [facility] of [address] [address]    Date of Surgery__________  Time to arrive __________ at the Main entrance    Post Op Appointment in the [address] [place] [address] Rd:      [address] CAREFULLY BEFORE SURGERY    1. NO ASPIRIN OR VITAMIN E FOR FIVE (5) DAYS PRIOR TO SURGERY  2. SHOWER / [address] WITH HIBICLENS SOAP THE NIGHT BEFORE AND THE MORNING OF SURGERY. (Available over the counter at any pharmacy)  3. Do NOT eat or drink anything after midnight the evening before surgery. Food or drink intake of any kind will result in the cancellation of surgery. 4. Be certain to report to your physician any changes in your physical condition. 5. A pre-operative exam MUST be performed by either your primary medical doctor or the hospital.  This must be within 30 days prior to your surgery date. AFTER SURGERY  1. A responsible adult is REQUIRED to accompany you to the hospital and remain there for your surgery.   If this arrangement has not been made at the time of your surgery, your surgery may be cancelled  2. You should not be left alone for 24  48 hours following surgery. 3. You should not drive, sign any important documents or make any important decisions until you have fully recovered from anesthesia (approximately 24  48 hours) AND are off all narcotic pain medications. · Any paperwork needing completion for either your employer or insurance company can be faxed, mailed or dropped off at our office to my attention. Please allow 7 business days for the paperwork to be completed. You will be contacted once it has been completed at which time you will be able to pick it up or have it mailed. · NOTE: Due to HIPPA policy, completed paperwork can not be faxed and per [facility] of 1-4-77, Fees for form completion may apply. If you have any questions, please feel free to call me at the number above.

## 2020-04-03 NOTE — PROGRESS NOTES
PATIENT NAME: Ynes Calzada     YOB: 1980     TODAY'S DATE: 4/3/2020    Reason for Visit:  Postprandial pain    Requesting Physician:  Dr. Thomas Lat:              The patient is a 38 y.o. female with a PMHx as delineated below who presents with with a long history of postprandial symptoms of pain, abdominal distension and bloating, and diarrhea. The patient has a very strict diet. She continues to develop pain in the RUQ and back that occurs 30 minutes after eating and this pain is constant and lasts hours.       Chief Complaint   Patient presents with    Surgical Consult     Gallbladder     Establish Care       REVIEW OF SYSTEMS:  CONSTITUTIONAL:  negative  HEENT:  negative  RESPIRATORY:  negative  CARDIOVASCULAR:  negative  GASTROINTESTINAL:  negative except for nausea, change in bowel habits and abdominal pain  GENITOURINARY:  negative  HEMATOLOGIC/LYMPHATIC:  negative  NEUROLOGICAL:  negative    PMH  Past Medical History:   Diagnosis Date    Bacterial overgrowth syndrome     Chest pain on breathing     Heart murmur     IBS (irritable bowel syndrome)     Kidney stones     Lightheaded 1/12/2016    MVP (mitral valve prolapse)     Near syncope 1/12/2016    Palpitations 1/12/2016    PSVT (paroxysmal supraventricular tachycardia) ([address]     Right ear pain 1/12/2016    Small bowel problem     states that she has small bowel overgrowth of bacteria    Spastic colon     Tachycardia        PSH  Past Surgical History:   Procedure Laterality Date    COLONOSCOPY  2009    DENTAL SURGERY      HYSTERECTOMY      LAPAROSCOPY         Social History  Social History     Socioeconomic History    Marital status:      Spouse name: Not on file    Number of children: Not on file    Years of education: Not on file    Highest education level: Not on file   Occupational History    Not on file   Social Needs    Financial resource strain: Not on file   Wilfredo-Radha insecurity     Worry: Not on file     Inability: Not on file    Transportation needs     Medical: Not on file     Non-medical: Not on file   Tobacco Use    Smoking status: Never Smoker    Smokeless tobacco: Never Used   Substance and Sexual Activity    Alcohol use: No    Drug use: No    Sexual activity: Yes   Lifestyle    Physical activity     Days per week: Not on file     Minutes per session: Not on file    Stress: Not on file   Relationships    Social connections     Talks on phone: Not on file     Gets together: Not on file     Attends Oriental orthodox service: Not on file     Active member of club or organization: Not on file     Attends meetings of clubs or organizations: Not on file     Relationship status: Not on file    Intimate partner violence     Fear of current or ex partner: Not on file     Emotionally abused: Not on file     Physically abused: Not on file     Forced sexual activity: Not on file   Other Topics Concern    Not on file   Social History Narrative    Not on file       Allergy:   Allergies   Allergen Reactions    Multivitamins Other (See Comments)     IBS    Penicillins Hives    Percocet [Oxycodone-Acetaminophen] Swelling    Vicodin [Hydrocodone-Acetaminophen] Swelling    Zithromax [Azithromycin] Nausea Only    Ciprofloxacin     Deltasone [Prednisone]     Tramadol        PHYSICAL EXAM:  VITALS:  /70 (Site: Left Upper Arm, Position: Sitting, Cuff Size: Medium Adult)   Temp 97.1 °F (36.2 °C) (Oral)   Ht 5' 6\" (1.676 m)   Wt 132 lb (59.9 kg)   BMI 21.31 kg/m²     CONSTITUTIONAL:  alert, no apparent distress and normal weight  EYES:  sclera clear  ENT:  normocepalic, without obvious abnormality  NECK:  supple, symmetrical, trachea midline and no carotid bruits  LUNGS:  clear to auscultation  CARDIOVASCULAR:  regular rate and rhythm and no murmur noted  ABDOMEN:  scars noted, normal bowel sounds, soft, non-distended, non-tender, voluntary guarding absent, no masses palpated

## 2020-05-12 ENCOUNTER — TELEPHONE (OUTPATIENT)
Dept: SURGERY | Age: 40
End: 2020-05-12

## 2023-04-19 ENCOUNTER — HOSPITAL ENCOUNTER (EMERGENCY)
Age: 43
Discharge: HOME | End: 2023-04-19

## 2023-04-19 VITALS
DIASTOLIC BLOOD PRESSURE: 70 MMHG | OXYGEN SATURATION: 99 % | BODY MASS INDEX: 24.54 KG/M2 | WEIGHT: 125 LBS | TEMPERATURE: 98.3 F | SYSTOLIC BLOOD PRESSURE: 110 MMHG | RESPIRATION RATE: 16 BRPM | HEART RATE: 65 BPM | HEIGHT: 60 IN

## 2023-04-19 DIAGNOSIS — R21 RASH AND OTHER NONSPECIFIC SKIN ERUPTION: Primary | ICD-10-CM

## 2023-04-19 LAB
ALBUMIN SERPL-MCNC: 4 G/DL (ref 3.4–5)
ALBUMIN/GLOB SERPL: 1.7 {RATIO} (ref 1.1–2.2)
ALP SERPL-CCNC: 73 U/L (ref 40–129)
ALT SERPL-CCNC: 8 U/L (ref 10–40)
ANION GAP SERPL CALCULATED.3IONS-SCNC: 8 MMOL/L (ref 3–16)
AST SERPL-CCNC: 18 U/L (ref 15–37)
BASOPHILS # BLD: 0 K/UL (ref 0–0.2)
BASOPHILS NFR BLD: 0.3 %
BILIRUB SERPL-MCNC: 0.6 MG/DL (ref 0–1)
BUN SERPL-MCNC: 9 MG/DL (ref 7–20)
CALCIUM SERPL-MCNC: 8.7 MG/DL (ref 8.3–10.6)
CHLORIDE SERPL-SCNC: 102 MMOL/L (ref 99–110)
CO2 SERPL-SCNC: 26 MMOL/L (ref 21–32)
CREAT SERPL-MCNC: 0.8 MG/DL (ref 0.6–1.1)
DEPRECATED RDW RBC AUTO: 14.2 % (ref 12.4–15.4)
EOSINOPHIL # BLD: 0 K/UL (ref 0–0.6)
EOSINOPHIL NFR BLD: 1.8 %
GFR SERPLBLD CREATININE-BSD FMLA CKD-EPI: >60 ML/MIN/{1.73_M2}
GLUCOSE SERPL-MCNC: 99 MG/DL (ref 70–99)
HCT VFR BLD AUTO: 36.4 % (ref 36–48)
HGB BLD-MCNC: 12.6 G/DL (ref 12–16)
LACTATE BLDV-SCNC: 0.8 MMOL/L (ref 0.4–1.9)
LYMPHOCYTES # BLD: 0.5 K/UL (ref 1–5.1)
LYMPHOCYTES NFR BLD: 22.5 %
MCH RBC QN AUTO: 35.6 PG (ref 26–34)
MCHC RBC AUTO-ENTMCNC: 34.6 G/DL (ref 31–36)
MCV RBC AUTO: 102.9 FL (ref 80–100)
MONOCYTES # BLD: 0.2 K/UL (ref 0–1.3)
MONOCYTES NFR BLD: 7.9 %
NEUTROPHILS # BLD: 1.6 K/UL (ref 1.7–7.7)
NEUTROPHILS NFR BLD: 67.5 %
PLATELET # BLD AUTO: 148 K/UL (ref 135–450)
PMV BLD AUTO: 8 FL (ref 5–10.5)
POTASSIUM SERPL-SCNC: 4 MMOL/L (ref 3.5–5.1)
PROT SERPL-MCNC: 6.4 G/DL (ref 6.4–8.2)
RBC # BLD AUTO: 3.54 M/UL (ref 4–5.2)
SODIUM SERPL-SCNC: 136 MMOL/L (ref 136–145)
WBC # BLD AUTO: 2.3 K/UL (ref 4–11)

## 2023-04-19 PROCEDURE — 83605 ASSAY OF LACTIC ACID: CPT

## 2023-04-19 PROCEDURE — 99283 EMERGENCY DEPT VISIT LOW MDM: CPT

## 2023-04-19 PROCEDURE — 80053 COMPREHEN METABOLIC PANEL: CPT

## 2023-04-19 PROCEDURE — 85025 COMPLETE CBC W/AUTO DIFF WBC: CPT

## 2023-04-19 ASSESSMENT — PAIN - FUNCTIONAL ASSESSMENT: PAIN_FUNCTIONAL_ASSESSMENT: 0-10

## 2023-04-19 ASSESSMENT — PAIN SCALES - GENERAL: PAINLEVEL_OUTOF10: 2

## 2023-04-19 NOTE — ED PROVIDER NOTES
Thank you!! HISTORY     Past Surgical History:   Procedure Laterality Date    COLONOSCOPY  2009    DENTAL SURGERY      HYSTERECTOMY (CERVIX STATUS UNKNOWN)      LAPAROSCOPY         CURRENTMEDICATIONS       Discharge Medication List as of 4/19/2023  6:06 PM        CONTINUE these medications which have NOT CHANGED    Details   Cholecalciferol (VITAMIN D3) 2000 units CAPS Take 1 capsule by mouth dailyHistorical Med      naproxen (NAPROSYN) 500 MG tablet Take 1 tablet by mouth 2 times daily for 10 days, Disp-20 tablet, R-0Print      Misc Natural Products (PROGESTERONE EX) Apply topicallyHistorical Med      fentaNYL (DURAGESIC) 12 MCG/HR Place 1 patch onto the skin every 72 hours . Historical Med      estradiol (VIVELLE) 0.1 MG/24HR Place 1 patch onto the skin twice a week. HYDROmorphone (DILAUDID) 2 MG tablet Take 4 mg by mouth.  Takes 1/4 of a pill as neededHistorical Med             ALLERGIES     Multivitamins, Penicillins, Percocet [oxycodone-acetaminophen], Vicodin [hydrocodone-acetaminophen], Zithromax [azithromycin], Ciprofloxacin, Deltasone [prednisone], and Tramadol    FAMILYHISTORY       Family History   Problem Relation Age of Onset    High Blood Pressure Mother     High Cholesterol Mother     Diabetes Father     Other Sister         cholitis, IBS, small bowel bacteria    Other Maternal Grandmother         IBS    Other Maternal Grandfather         IBS    Diabetes Paternal Grandmother     Other Paternal Grandmother         IBS    Diabetes Paternal Grandfather     Other Paternal Grandfather         IBS        SOCIAL HISTORY       Social History     Tobacco Use    Smoking status: Never    Smokeless tobacco: Never   Vaping Use    Vaping Use: Never used   Substance Use Topics    Alcohol use: No    Drug use: No       SCREENINGS        Celestine Coma Scale  Eye Opening: Spontaneous  Best Verbal Response: Oriented  Best Motor Response: Obeys commands  Dorrance Coma Scale Score: 15                CIWA Assessment  BP: 110/70  Heart

## 2023-04-19 NOTE — ED NOTES
Pt instructed to follow up with PCP. Assessed per [place] PA.      Man Candelaria LPN  82/08/10 0510

## 2023-08-21 ENCOUNTER — TELEPHONE (OUTPATIENT)
Dept: CARDIOLOGY CLINIC | Age: 43
End: 2023-08-21

## 2023-08-21 NOTE — TELEPHONE ENCOUNTER
Pt called stating they are feeling very fatigue, SOB, and heart rate is either to running slow or really fast.    MXA seen pt 2019, pt is requesting appt ASAP.     Pls advise thank you

## 2023-08-21 NOTE — TELEPHONE ENCOUNTER
MXA is unavailable until after 09/05/2023. May offer 09/07/203 3pm If symptoms are severe she should go to the ER.

## 2023-08-30 ENCOUNTER — HOSPITAL ENCOUNTER (OUTPATIENT)
Age: 43
Discharge: HOME | End: 2023-08-30
Payer: COMMERCIAL

## 2023-08-30 ENCOUNTER — OFFICE VISIT (OUTPATIENT)
Dept: CARDIOLOGY CLINIC | Age: 43
End: 2023-08-30

## 2023-08-30 ENCOUNTER — TELEPHONE (OUTPATIENT)
Dept: CARDIOLOGY CLINIC | Age: 43
End: 2023-08-30

## 2023-08-30 VITALS
OXYGEN SATURATION: 99 % | HEART RATE: 58 BPM | HEIGHT: 66 IN | BODY MASS INDEX: 19.93 KG/M2 | WEIGHT: 124 LBS | SYSTOLIC BLOOD PRESSURE: 122 MMHG | DIASTOLIC BLOOD PRESSURE: 60 MMHG

## 2023-08-30 DIAGNOSIS — M54.2 NECK PAIN, BILATERAL: ICD-10-CM

## 2023-08-30 DIAGNOSIS — R00.2 PALPITATIONS: ICD-10-CM

## 2023-08-30 DIAGNOSIS — R01.1 HEART MURMUR: ICD-10-CM

## 2023-08-30 DIAGNOSIS — I77.6 VASCULITIS (HCC): ICD-10-CM

## 2023-08-30 DIAGNOSIS — M25.50 ARTHRALGIA, UNSPECIFIED JOINT: ICD-10-CM

## 2023-08-30 DIAGNOSIS — I47.1 ATRIAL TACHYCARDIA (HCC): Primary | ICD-10-CM

## 2023-08-30 DIAGNOSIS — R00.1 BRADYCARDIA: ICD-10-CM

## 2023-08-30 DIAGNOSIS — R00.0 TACHYCARDIA: ICD-10-CM

## 2023-08-30 LAB
CRP SERPL-MCNC: <3 MG/L (ref 0–5.1)
ERYTHROCYTE [SEDIMENTATION RATE] IN BLOOD BY WESTERGREN METHOD: 11 MM/HR (ref 0–20)

## 2023-08-30 PROCEDURE — 85652 RBC SED RATE AUTOMATED: CPT

## 2023-08-30 PROCEDURE — 86140 C-REACTIVE PROTEIN: CPT

## 2023-08-30 PROCEDURE — 36415 COLL VENOUS BLD VENIPUNCTURE: CPT

## 2023-08-30 PROCEDURE — 86038 ANTINUCLEAR ANTIBODIES: CPT

## 2023-08-30 NOTE — TELEPHONE ENCOUNTER
Monitor placed by [address] [place] [address] [employer]  Length of monitor 28 days  Monitor ordered by [facility]  Serial number Porfirio Douglas ID [id number]  Activation successful prior to pt leaving office?  Yes

## 2023-08-30 NOTE — PATIENT INSTRUCTIONS
EKG today  Echocardiogram to access heart size and strength. Call [phone] to schedule this test.  Carotid Artery test. Call [phone] to schedule this test   Follow up with EP Dr Dereck Gomez as scheduled 9/7/23. Lab work - ESR, CRP, ASA for auto immune process  Cardiac Event Monitor for 4 weeks for palpitations/ bradycardia. I have referred you to Rheumatology for joint pain. Assessment of vasculitis. Phone: [phone]  Follow up with me depending on testing.

## 2023-08-30 NOTE — PROGRESS NOTES
[oxycodone-acetaminophen], Vicodin [hydrocodone-acetaminophen], Zithromax [azithromycin], Ciprofloxacin, Deltasone [prednisone], and Tramadol     Review of Systems:   A 14 point review of symptoms completed. Pertinent positives identified in the HPI, all other review of symptoms negative as below. Objective: There were no vitals filed for this visit. PHYSICAL EXAM:    General:  Alert, cooperative, no distress, appears stated age   Head:  Normocephalic, atraumatic   Eyes:  Conjunctiva/corneas clear, anicteric sclerae    Nose: Nares normal, no drainage or sinus tenderness   Throat: No abnormalities of the lips, oral mucosa or tongue. Neck: Trachea midline. Neck supple with no lymphadenopathy, thyroid not enlarged, symmetric, no tenderness/mass/nodules, no Jugular venous pressure elevation    Lungs:   Clear to auscultation bilaterally, no wheezes, no rales, no respiratory distress   Chest Wall:  No deformity or tenderness to palpation   Heart:  Regular rate and rhythm, normal S1, normal S2, no murmur, no rub, no S3/S4, PMI non-displaced. Abdomen:   Soft, non-tender, with normoactive bowel sounds. No masses, no hepatosplenomegaly   Extremities: No cyanosis, clubbing or pitting edema. Vascular: 2+ radial, brachial, femoral, dorsalis pedis and posterior tibial pulses bilaterally. Brisk carotid upstrokes without carotid bruit. Skin: Skin color, texture, turgor are normal with no rashes or ulceration. Pysch: Euthymic mood, appropriate affect   Neurologic: Oriented to person, place and time. No slurred speech or facial asymmetry. No motor or sensory deficits on gross examination.          Labs:   CBC:   Lab Results   Component Value Date/Time    WBC 2.3 04/19/2023 04:55 PM    RBC 3.54 04/19/2023 04:55 PM    HGB 12.6 04/19/2023 04:55 PM    HCT 36.4 04/19/2023 04:55 PM    .9 04/19/2023 04:55 PM    RDW 14.2 04/19/2023 04:55 PM     04/19/2023 04:55 PM     CMP:  Lab Results   Component

## 2023-08-31 LAB — ANA SER QL IA: NEGATIVE

## 2023-09-01 ENCOUNTER — TELEPHONE (OUTPATIENT)
Dept: CARDIOLOGY CLINIC | Age: 43
End: 2023-09-01

## 2023-09-01 NOTE — TELEPHONE ENCOUNTER
Pt called stated amp referred her to rheumatologist but upon calling, they have not received referral yet, they advised pt to call our office and ask to resend but to please add Dr. Zofia Díaz name, please advise

## 2023-09-01 NOTE — TELEPHONE ENCOUNTER
----- Message from Dayanara Best DO sent at 8/31/2023  4:04 PM EDT -----  Please let patient know that the basic screening labs for autoimmune/vasculitis were negative. She should follow up with her PCP/Rheumatology regarding her symptoms she discussed with me yesterday.

## 2023-09-07 ENCOUNTER — OFFICE VISIT (OUTPATIENT)
Dept: CARDIOLOGY CLINIC | Age: 43
End: 2023-09-07
Payer: COMMERCIAL

## 2023-09-07 VITALS
HEIGHT: 66 IN | HEART RATE: 56 BPM | WEIGHT: 124.6 LBS | SYSTOLIC BLOOD PRESSURE: 102 MMHG | OXYGEN SATURATION: 98 % | BODY MASS INDEX: 20.03 KG/M2 | DIASTOLIC BLOOD PRESSURE: 60 MMHG

## 2023-09-07 DIAGNOSIS — I47.1 ATRIAL TACHYCARDIA (HCC): ICD-10-CM

## 2023-09-07 DIAGNOSIS — R07.89 CHEST DISCOMFORT: ICD-10-CM

## 2023-09-07 DIAGNOSIS — R00.1 BRADYCARDIA: Primary | ICD-10-CM

## 2023-09-07 PROCEDURE — 99204 OFFICE O/P NEW MOD 45 MIN: CPT | Performed by: INTERNAL MEDICINE

## 2023-09-07 NOTE — PROGRESS NOTES
[facility]   Electrophysiology Consultation   Date: 9/7/2023  Reason for Consultation: Bradycardia/Chest Pain    Consult Requesting Physician: SELF   Chief Complaint   Patient presents with    Bradycardia     Pulse at rest 43,usually 48 at rest    Chest Pain     Ocas cp,sob better last few days       CC: Bradycardia, Chest Pain    HPI: Kristin Dao is a 39 y.o. female  with a PMH significant for severe IBS, spastic colon and small bowel bacterial overgrowth disease managed with chronic pain medication and accupuncture (managed through [facility]). S/p 11/30/17 Successful ablation of three right sided atrial tachycardias (PACS) by .    08/23/23 Patient presented in office with PCP for complaints of irregular heartbeat. 08/30/23 Seen in clinic by Dr. Darian Hoyt at [facility] for further evaluation. Had complaints of palpitations, headache, and shortness of breath. Reports Kardia mobile average HR of around 50 bpm with recent HR in the 30s. Also noted increased fatigue, proximal muscle weakness, carotid tenderness, joint pain, weight loss, and oral ulcers. 30 day monitor and echo ordered. Referral to rheumatology placed. Interval History:  Jacey Dsouza presents to the office today to re-establish care. She is here today with her . A few weeks ago she woke up with pain in her chest both arms and neck and also noticed her heart rate to be in the 49 range. Since then she has had nonspecific pains including her ankles and sharp pain that is intermittent in the chest and arms and has been checking her pulse and runs in the mid 40s at times.       Assessment and plan:   Bradycardia, chest discomfort   - ECG 08/30/23 revealed sinus bradycardia with rate of 50 bpm    - plan for upcoming echo    - currently wearing monitor     Her monitor from 2016 showed a heart rate as low as 43 and average of 68 and therefore I do not think her current heart rate is too far off from her

## 2023-09-07 NOTE — TELEPHONE ENCOUNTER
Order reprinted at the ATTN: Dr Keyla Car    Faxed to [phone]    Spoke with pt and let her know about the reprinted referral fax and to call us back if they still do not receive.

## 2023-10-05 DIAGNOSIS — R00.2 PALPITATIONS: ICD-10-CM

## 2023-10-05 DIAGNOSIS — R00.1 BRADYCARDIA: ICD-10-CM

## 2023-10-16 ENCOUNTER — TELEPHONE (OUTPATIENT)
Dept: CARDIOLOGY CLINIC | Age: 43
End: 2023-10-16

## 2023-10-16 NOTE — TELEPHONE ENCOUNTER
Pt would like results of monitor and wants to know if she still needs to do the echo. Please advise.

## 2023-10-17 NOTE — TELEPHONE ENCOUNTER
Monitor results relayed to patient. (Predominately NSR, no arrhythmia's noted)   Patient states she followed up with her EP doctor and he told her she didn't need the carotid US and stated she really didn't need the Echo, but that it was up to her after she got her monitor results. So she is scheduled tomorrow for Echo. Cost for her will be $750. Since monitor was mainly normal, she is wanting to know if she can cancel the echo?    Please advise

## 2023-12-12 ENCOUNTER — INITIAL CONSULT (OUTPATIENT)
Dept: SURGERY | Age: 43
End: 2023-12-12
Payer: COMMERCIAL

## 2023-12-12 VITALS
SYSTOLIC BLOOD PRESSURE: 110 MMHG | BODY MASS INDEX: 20.76 KG/M2 | WEIGHT: 129.2 LBS | DIASTOLIC BLOOD PRESSURE: 64 MMHG | HEIGHT: 66 IN

## 2023-12-12 DIAGNOSIS — R10.11 RUQ PAIN: Primary | ICD-10-CM

## 2023-12-12 PROCEDURE — 99204 OFFICE O/P NEW MOD 45 MIN: CPT | Performed by: SURGERY

## 2023-12-12 RX ORDER — SODIUM CHLORIDE 9 MG/ML
INJECTION, SOLUTION INTRAVENOUS PRN
OUTPATIENT
Start: 2023-12-12

## 2023-12-12 RX ORDER — SODIUM CHLORIDE 0.9 % (FLUSH) 0.9 %
5-40 SYRINGE (ML) INJECTION EVERY 12 HOURS SCHEDULED
OUTPATIENT
Start: 2023-12-12

## 2023-12-12 RX ORDER — SODIUM CHLORIDE 0.9 % (FLUSH) 0.9 %
5-40 SYRINGE (ML) INJECTION PRN
OUTPATIENT
Start: 2023-12-12

## 2023-12-12 RX ORDER — INDOCYANINE GREEN AND WATER 25 MG
2.5 KIT INJECTION ONCE
OUTPATIENT
Start: 2023-12-12 | End: 2023-12-12

## 2023-12-12 NOTE — PATIENT INSTRUCTIONS
[address] [facility]  Phone: [phone]  Fax: [phone] will be scheduled for surgery with Dr. Evonne Moore. The office will call you with the date and time that surgery is scheduled. Please take note of these instructions for surgery: You should have nothing by mouth after midnight the night before your surgery - this includes no food or water. Your surgery will be cancelled if you have taken anything by mouth after midnight, NO exceptions. You will need to have a history and physical prior to your surgery. This will need to be completed up to 30 days before your surgery. This H/P can be completed by your family doctor or the hospital.   IF you take coumadin (warfarin), please stop taking this medication 5 days prior to your surgery. IF you take plavix, please stop taking this 7 days prior to your surgery. Please contact our office if you have a pacemaker or defibrillator. IF you are allergic to latex, please tell our office prior to your surgery. This is important in know before scheduling your surgery. IF you are having an out patient surgery, you will need someone available to drive you home after your surgery, and to also stay with you for the rest of the day. IF you are having a surgery requiring an inpatient stay in the hospital, you will need someone to drive you home upon discharge from the hospital.  Please contact Dr. Oni Marlow assistant Moises Pedroza if you have any questions or concerns. Please call the office with any changes in your symptoms or further questions/concerns.  [phone]

## 2023-12-15 ENCOUNTER — HOSPITAL ENCOUNTER (OUTPATIENT)
Dept: CT IMAGING | Age: 43
Discharge: HOME | End: 2023-12-15
Attending: SURGERY
Payer: COMMERCIAL

## 2023-12-15 DIAGNOSIS — R10.11 RUQ PAIN: ICD-10-CM

## 2023-12-15 PROCEDURE — 6360000004 HC RX CONTRAST MEDICATION: Performed by: SURGERY

## 2023-12-15 PROCEDURE — 74177 CT ABD & PELVIS W/CONTRAST: CPT

## 2023-12-15 RX ADMIN — IOPAMIDOL 75 ML: 755 INJECTION, SOLUTION INTRAVENOUS at 17:56

## 2024-01-10 ENCOUNTER — PATIENT MESSAGE (OUTPATIENT)
Dept: CARDIOLOGY CLINIC | Age: 44
End: 2024-01-10

## 2024-01-10 NOTE — TELEPHONE ENCOUNTER
From: Margi Agarwal  To: Dr. Kemar So  Sent: 1/10/2024 12:26 PM EST  Subject: My heart    Hello Dr. So!   I had a ELLEN done by Dr. Bentley Vidales (in the same biulding as you) for a gallbladder issue. The scan came back fine, although it showed fluid around my heart. Would you be able to look at that scan and tell me what your thoughts are?     Thank you, Margi Agarwal

## 2024-01-11 NOTE — TELEPHONE ENCOUNTER
CT notes possible pericardial effusion that is only partially visualized.  As I do not recent CT I cannot comment further.  However I see she had echocardiogram previously ordered, this modality could further characterize this finding.  Will have a certain amount of fluid around the heart is a normal finding, we will better characterize the size by echocardiogram and see if there is truly abnormality there.  Sometimes a CT can overestimate or even mischaracterize a benign pericardial fat pad as fluid.  If echo order is  we can place new order for pericardial effusion.  Thank you

## 2024-09-23 PROBLEM — Z90.710 HISTORY OF HYSTERECTOMY: Status: ACTIVE | Noted: 2024-09-23

## 2024-09-25 ENCOUNTER — TELEPHONE (OUTPATIENT)
Dept: OBGYN CLINIC | Age: 44
End: 2024-09-25

## 2024-09-25 NOTE — TELEPHONE ENCOUNTER
Pt calling to check status of HRT. She says our office was going to confirm previous dose with [facility] so that medication could be reordered. Routing to [place] for follow up.

## 2024-09-26 NOTE — TELEPHONE ENCOUNTER
Called compounding pharmacy and per Yudi her dosing was:   Estriol 20%  Estradial 80% 3mg/mL  Apply 0.4mL daily    And   Progesterone 6% apply 0.8mL daily.    LVM for patient to see if she currently needs any refills. Thank you.

## 2024-11-26 ENCOUNTER — HOSPITAL ENCOUNTER (OUTPATIENT)
Dept: WOMENS IMAGING | Age: 44
End: 2024-11-26
Payer: COMMERCIAL

## 2024-11-26 ENCOUNTER — HOSPITAL ENCOUNTER (OUTPATIENT)
Dept: WOMENS IMAGING | Age: 44
Discharge: HOME | End: 2024-11-26
Payer: COMMERCIAL

## 2024-11-26 VITALS — HEIGHT: 65 IN | BODY MASS INDEX: 20.66 KG/M2 | WEIGHT: 124 LBS

## 2024-11-26 DIAGNOSIS — N64.4 BREAST PAIN: ICD-10-CM

## 2024-11-26 DIAGNOSIS — Z12.31 ENCOUNTER FOR SCREENING MAMMOGRAM FOR MALIGNANT NEOPLASM OF BREAST: ICD-10-CM

## 2024-11-26 PROCEDURE — 77063 BREAST TOMOSYNTHESIS BI: CPT

## 2024-12-02 ENCOUNTER — TELEPHONE (OUTPATIENT)
Dept: OBGYN CLINIC | Age: 44
End: 2024-12-02

## 2024-12-02 DIAGNOSIS — R92.30 BREAST DENSITY: Primary | ICD-10-CM

## 2024-12-06 ENCOUNTER — HOSPITAL ENCOUNTER (OUTPATIENT)
Dept: WOMENS IMAGING | Age: 44
Discharge: HOME | End: 2024-12-06
Payer: COMMERCIAL

## 2024-12-06 DIAGNOSIS — R92.30 BREAST DENSITY: ICD-10-CM

## 2024-12-06 PROCEDURE — 76641 ULTRASOUND BREAST COMPLETE: CPT

## 2025-01-24 ENCOUNTER — HOSPITAL ENCOUNTER (EMERGENCY)
Age: 45
Discharge: HOME | End: 2025-01-24
Payer: COMMERCIAL

## 2025-01-24 ENCOUNTER — APPOINTMENT (OUTPATIENT)
Dept: GENERAL RADIOLOGY | Age: 45
End: 2025-01-24
Payer: COMMERCIAL

## 2025-01-24 ENCOUNTER — APPOINTMENT (OUTPATIENT)
Dept: CT IMAGING | Age: 45
End: 2025-01-24
Payer: COMMERCIAL

## 2025-01-24 VITALS
TEMPERATURE: 98.1 F | OXYGEN SATURATION: 97 % | BODY MASS INDEX: 21.37 KG/M2 | HEART RATE: 98 BPM | RESPIRATION RATE: 18 BRPM | WEIGHT: 128.4 LBS | SYSTOLIC BLOOD PRESSURE: 123 MMHG | DIASTOLIC BLOOD PRESSURE: 74 MMHG

## 2025-01-24 DIAGNOSIS — S70.02XA CONTUSION OF LEFT HIP, INITIAL ENCOUNTER: ICD-10-CM
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
## 2025-01-24 DIAGNOSIS — S93.402A SPRAIN OF LEFT ANKLE, UNSPECIFIED LIGAMENT, INITIAL ENCOUNTER: ICD-10-CM

## 2025-01-24 DIAGNOSIS — S83.92XA SPRAIN OF LEFT KNEE, UNSPECIFIED LIGAMENT, INITIAL ENCOUNTER: ICD-10-CM

## 2025-01-24 PROCEDURE — 73610 X-RAY EXAM OF ANKLE: CPT

## 2025-01-24 PROCEDURE — 99284 EMERGENCY DEPT VISIT MOD MDM: CPT

## 2025-01-24 PROCEDURE — 6370000000 HC RX 637 (ALT 250 FOR IP): Performed by: PHYSICIAN ASSISTANT

## 2025-01-24 PROCEDURE — 72192 CT PELVIS W/O DYE: CPT

## 2025-01-24 PROCEDURE — 73502 X-RAY EXAM HIP UNI 2-3 VIEWS: CPT

## 2025-01-24 RX ORDER — ACETAMINOPHEN 325 MG/1
650 TABLET ORAL ONCE
Status: DISCONTINUED | OUTPATIENT
Start: 2025-01-24 | End: 2025-01-24 | Stop reason: HOSPADM

## 2025-01-24 RX ORDER — IBUPROFEN 600 MG/1
600 TABLET, FILM COATED ORAL ONCE
Status: COMPLETED | OUTPATIENT
Start: 2025-01-24 | End: 2025-01-24

## 2025-01-24 RX ADMIN — IBUPROFEN 600 MG: 600 TABLET, FILM COATED ORAL at 15:25

## 2025-01-24 ASSESSMENT — PAIN SCALES - GENERAL: PAINLEVEL_OUTOF10: 7

## 2025-01-24 NOTE — ED PROVIDER NOTES
[facility] EMERGENCY DEPARTMENT  EMERGENCY DEPARTMENT ENCOUNTER        Pt Name: Margi Agarwal  MRN: [id number]  Birthdate 1980  Date of evaluation: 1/24/2025  Provider: BELINDA ANSARI PA-C  PCP: Lazaro Johnson DO  ED Attending: MD Sunita      SIM. I have evaluated this patient.      CHIEF COMPLAINT:     Chief Complaint   Patient presents with    Fall     On Sunday, left sided hip and knee pain, was sent to [facility] for an MRI of knee, was told she had tears in her ACL, continuing to have pain in left hip        HISTORY OF PRESENT ILLNESS:      History provided by the patient. No limitations.    Margi Agarwal is a 44 y.o. female who arrives to the ED by private vehicle.  This patient suffered a fall on Sunday, 1/19.  She states she was wearing 4 inch heels, coming home from [employer] when she slipped on ice/salt and fell.  She landed on her left side and injured her left leg primarily.  She was seen by her chiropractor this week.  She had an MRI of her left knee at [facility] and was given an informal read by her chiropractor that told her she tore her ACL.  The patient has been getting around with crutches and a knee brace on her left knee but also complains of severe pain to her left hip/left pelvis as well as some pain to her left ankle    Nursing Notes were reviewed     REVIEW OF SYSTEMS:     Review of Systems  Positives and pertinent negatives as per HPI.      PAST MEDICAL HISTORY:     Past Medical History:   Diagnosis Date    Bacterial overgrowth syndrome     Chest pain on breathing     Heart murmur     IBS (irritable bowel syndrome)     Kidney stones     Lightheaded 1/12/2016    MVP (mitral valve prolapse)     Near syncope 1/12/2016    Palpitations 1/12/2016    PSVT (paroxysmal supraventricular tachycardia) (HCC)     Right ear pain 1/12/2016    Small bowel problem     states that she has small bowel overgrowth of bacteria    Spastic colon     Tachycardia        SURGICAL HISTORY:   Oral Given 1/24/25 [id number])       Patient was evaluated by me     CC/HPI Summary, DDx, ED course, and Reassessment: Patient here after suffering a fall on Sunday.  She has left leg pain including left hip, left knee and left ankle pain.  She did have an MRI of her left knee already.  Differential diagnoses: Contusion, fracture, sprain/strain    The patient was evaluated in ED room T4  Vital signs Blood pressure 123/74, pulse 98, temperature 98.1 °F (36.7 °C), resp. rate 18, weight 58.2 kg (128 lb 6.4 oz), SpO2 97%, not currently breastfeeding.    Based on the patient's symptoms and mechanism of injury I ordered x-rays of the left hip and left ankle.  Patient given Tylenol orally along with ibuprofen orally for pain.  X-ray left hip: No acute fracture or dislocation.  Hip joint spaces are maintained.  Soft tissues unremarkable.  X-ray left ankle: No evidence of acute fracture or dislocation.  Joint spaces are maintained.  Soft tissues are unremarkable.    Patient was reassessed and states her left hip is tender, she feels something is wrong.  Based on this I did order a CT of her pelvis.    CT pelvis: No acute traumatic abnormality identified.    The patient was reassessed again.  I told her that I recommended continued use of crutches, Tylenol and ibuprofen for pain (she says she cannot take Norco or Percocet).  She will be given a referral to orthopedics and is recommended to [facility] in the meantime.  I told her if her hip pain persists at this level of severity, she may need an MRI at some point.  At this point she is able to get up independently, stand independently and even walk a short distance on her own.  The crutches do make it more bearable.    Exclusion criteria - the patient is NOT to be included for SEP-1 Core Measure due to:  Infection is not suspected     Consults/discussion with other professionals: None  Social determinants: None  Chronic conditions:   Past Medical History:   Diagnosis Date    Bacterial  - - -